# Patient Record
Sex: FEMALE | Race: WHITE | NOT HISPANIC OR LATINO | URBAN - METROPOLITAN AREA
[De-identification: names, ages, dates, MRNs, and addresses within clinical notes are randomized per-mention and may not be internally consistent; named-entity substitution may affect disease eponyms.]

---

## 2021-01-28 ENCOUNTER — EMERGENCY (EMERGENCY)
Facility: HOSPITAL | Age: 51
LOS: 1 days | End: 2021-01-28
Admitting: EMERGENCY MEDICINE
Payer: COMMERCIAL

## 2021-01-28 PROCEDURE — 93010 ELECTROCARDIOGRAM REPORT: CPT

## 2021-01-28 PROCEDURE — 99285 EMERGENCY DEPT VISIT HI MDM: CPT

## 2021-01-29 PROCEDURE — 71045 X-RAY EXAM CHEST 1 VIEW: CPT | Mod: 26

## 2022-01-07 PROBLEM — Z00.00 ENCOUNTER FOR PREVENTIVE HEALTH EXAMINATION: Status: ACTIVE | Noted: 2022-01-07

## 2022-01-12 ENCOUNTER — APPOINTMENT (OUTPATIENT)
Dept: CARDIOLOGY | Facility: CLINIC | Age: 52
End: 2022-01-12
Payer: COMMERCIAL

## 2022-01-12 ENCOUNTER — NON-APPOINTMENT (OUTPATIENT)
Age: 52
End: 2022-01-12

## 2022-01-12 VITALS — SYSTOLIC BLOOD PRESSURE: 144 MMHG | DIASTOLIC BLOOD PRESSURE: 74 MMHG

## 2022-01-12 VITALS
RESPIRATION RATE: 15 BRPM | HEIGHT: 64 IN | SYSTOLIC BLOOD PRESSURE: 150 MMHG | DIASTOLIC BLOOD PRESSURE: 80 MMHG | HEART RATE: 77 BPM | OXYGEN SATURATION: 97 % | BODY MASS INDEX: 34.83 KG/M2 | WEIGHT: 204 LBS | TEMPERATURE: 97.1 F

## 2022-01-12 DIAGNOSIS — Z78.9 OTHER SPECIFIED HEALTH STATUS: ICD-10-CM

## 2022-01-12 DIAGNOSIS — F12.90 CANNABIS USE, UNSPECIFIED, UNCOMPLICATED: ICD-10-CM

## 2022-01-12 DIAGNOSIS — Z87.891 PERSONAL HISTORY OF NICOTINE DEPENDENCE: ICD-10-CM

## 2022-01-12 PROCEDURE — 99205 OFFICE O/P NEW HI 60 MIN: CPT

## 2022-01-12 NOTE — REASON FOR VISIT
[FreeTextEntry1] : Patient is seen because of hyperlipidemia.  The patient has extreme anxiety.  She had anxiety since childhood.  The patient has chest discomfort very frequently.  Is not consistently related to exertion.  She walks around with severe anxiety and a heavy like sensation in her chest.  She describes an evaluation at [facility] although she is uncertain as to exactly what was done.  She has never undergone invasive coronary angiography.  Patient has fairly good exercise capacity without exertional symptoms.\par \par The patient underwent [facility] screening and was found to have a total cholesterol of 314 with an LDL of 205, triglycerides of 300 and HDL of 49.  In addition there was some blockage found in the lower extremities as well as the carotid arteries.  The patient is extremely anxious about these findings and now seeks further advice.  The patient is not on aspirin therapy.  She has been told of high cholesterol in the past but has never embarked on statin therapy.

## 2022-01-12 NOTE — ASSESSMENT
[FreeTextEntry1] : Chest discomfort: ECG reveals anterior ST changes and T wave changes.  [facility] has suspended exercise stress testing because of pandemic spreading concerns.  CT coronary angiography is the patient's best option to rule out ischemic heart disease as an etiology to her chest discomfort and abnormal ECG.  There is no drug allergies.  Baby aspirin daily has been started.  A basic metabolic panel has been arranged 1 week prior to CT coronary angiography.\par \par Hyperlipidemia: Lipitor 40 mg daily has been started.  LFTs and cholesterol profile have been arranged in the fasting state in 6 weeks time.  Prestatin LFTs have also been arranged.\par \par Hypertension: Bystolic has been increased from 5 mg daily to 10 mg daily.

## 2022-01-24 ENCOUNTER — APPOINTMENT (OUTPATIENT)
Dept: CARDIOLOGY | Facility: CLINIC | Age: 52
End: 2022-01-24

## 2023-01-18 ENCOUNTER — NON-APPOINTMENT (OUTPATIENT)
Age: 53
End: 2023-01-18

## 2023-01-30 ENCOUNTER — TRANSCRIPTION ENCOUNTER (OUTPATIENT)
Age: 53
End: 2023-01-30

## 2023-01-30 ENCOUNTER — APPOINTMENT (OUTPATIENT)
Dept: CARDIOLOGY | Facility: CLINIC | Age: 53
End: 2023-01-30
Payer: COMMERCIAL

## 2023-01-30 VITALS
HEART RATE: 84 BPM | TEMPERATURE: 97.4 F | DIASTOLIC BLOOD PRESSURE: 80 MMHG | SYSTOLIC BLOOD PRESSURE: 130 MMHG | OXYGEN SATURATION: 98 % | WEIGHT: 195 LBS | HEIGHT: 63 IN | RESPIRATION RATE: 14 BRPM | BODY MASS INDEX: 34.55 KG/M2

## 2023-01-30 VITALS — SYSTOLIC BLOOD PRESSURE: 134 MMHG | DIASTOLIC BLOOD PRESSURE: 82 MMHG

## 2023-01-30 DIAGNOSIS — F41.9 ANXIETY DISORDER, UNSPECIFIED: ICD-10-CM

## 2023-01-30 DIAGNOSIS — I10 ESSENTIAL (PRIMARY) HYPERTENSION: ICD-10-CM

## 2023-01-30 DIAGNOSIS — Z87.898 PERSONAL HISTORY OF OTHER SPECIFIED CONDITIONS: ICD-10-CM

## 2023-01-30 DIAGNOSIS — R94.31 ABNORMAL ELECTROCARDIOGRAM [ECG] [EKG]: ICD-10-CM

## 2023-01-30 DIAGNOSIS — E78.00 PURE HYPERCHOLESTEROLEMIA, UNSPECIFIED: ICD-10-CM

## 2023-01-30 PROCEDURE — 99214 OFFICE O/P EST MOD 30 MIN: CPT

## 2023-01-30 RX ORDER — NEBIVOLOL 10 MG/1
10 TABLET ORAL DAILY
Qty: 90 | Refills: 1 | Status: DISCONTINUED | COMMUNITY
End: 2023-01-30

## 2023-01-30 RX ORDER — ATORVASTATIN CALCIUM 40 MG/1
40 TABLET, FILM COATED ORAL
Qty: 30 | Refills: 3 | Status: DISCONTINUED | COMMUNITY
Start: 2022-01-12 | End: 2023-01-30

## 2023-01-30 NOTE — REASON FOR VISIT
[FreeTextEntry1] : [place] is a 52-year-old female with severe hyperlipidemia.  \par \par The patient has extreme anxiety.  She had anxiety since childhood.  The patient has chest discomfort very frequently.  Is not consistently related to exertion.  She walks around with severe anxiety and a heavy like sensation in her chest.  She describes an evaluation at [facility] although she is uncertain as to exactly what was done.  She has never undergone invasive coronary angiography.  Patient has fairly good exercise capacity without exertional symptoms.\par \par The patient underwent [facility] screening and was found to have a total cholesterol of 314 with an LDL of 205, triglycerides of 300 and HDL of 49.  In addition there was some blockage found in the lower extremities as well as the carotid arteries?  The patient was extremely anxious about these findings \par \par She had CT of the coronaries which showed 0 calcium score normal coronaries in 2021.  She had echocardiogram in February 2021 which was unremarkable.  Her carotid ultrasound in January 2022 was unremarkable.  There were no significant plaques and there was no obstruction.  She denies claudication.\par \par She was recommended and prescribed statins but she did not take it.  She just started Crestor 10 mg yesterday. \par \par Her last fasting labs on December 27, 2022 showed normal FBS,  (not on statins) and HDL 59.  Normal LFT and creatinine\par \par In the past few weeks, she has started to exercise and lost 10 pounds in weight.  Her chest pains are resolved.  She is feeling much better on buspirone\par \par She used to take Byastolic for hypertension which she has stopped several months ago.  Blood pressure is borderline in the office today.\par \par EKG 1/30/2023: Sinus rhythm.  T inversion in inferior leads.  Also V3 to V6.

## 2023-01-30 NOTE — DISCUSSION/SUMMARY
[FreeTextEntry1] : Hypercholesteremia: Patient had severe hypercholesterolemia with LDL greater than 200 at 1 time.  She probably has familial hypercholesterolemia.  He was recommended statins during OV January 2022 by Dr. Valero which she did not pursue.  She is now on statin which was started yesterday.  Target LDL less than 100.  Check labs in 4 weeks.  The dose of rosuvastatin may have to be increased.  Dietary changes were discussed.  She has started exercise program already.\par \par Hypertension: Borderline.  Currently not on any medications.  Nonpharmacologic ways of reducing blood pressure were discussed.  Blood pressure response to treadmill exercise would be helpful.\par \par Patient does not have any evidence of CAD.  CT calcium score was 0 in 2021 and coronary angiogram was unremarkable on CTA.  She did have uncontrolled hypercholesterolemia since then.  Last echocardiogram will be performed since her baseline EKG is abnormal with  downsloping ST segments and inverted T waves\par \par Anxiety: This has improved significantly with buspirone.  She no longer has chest pain or palpitations.  \par \par No claudication.  Previous carotid ultrasounds have been unremarkable.\par \par Thank you for this referral and allowing me to participate in the care of this patient.  If I can be of any further help or  if you have any questions, please do not hesitate to contact me\par \par \par Sincerely,\par \par Omero Sandoval MD, [facility], [id number]

## 2023-01-30 NOTE — ASSESSMENT
[FreeTextEntry1] : Chest discomfort: ECG reveals anterior ST changes and T wave changes.  [facility] has suspended exercise stress testing because of pandemic spreading concerns.  CT coronary angiography is the patient's best option to rule out ischemic heart disease as an etiology to her chest discomfort and abnormal ECG.  There is no drug allergies.  Baby aspirin daily has been started.  A basic metabolic panel has been arranged 1 week prior to CT coronary angiography.\par \par Hyperlipidemia: Lipitor 40 mg daily has been started.  LFTs and cholesterol profile have been arranged in the fasting state in 6 weeks time.  Prestatin LFTs have also been arranged.\par \par Hypertension: Bystolic has been increased from 5 mg daily to 10 mg daily.

## 2023-02-08 ENCOUNTER — APPOINTMENT (OUTPATIENT)
Dept: OBGYN | Facility: CLINIC | Age: 53
End: 2023-02-08
Payer: COMMERCIAL

## 2023-02-08 VITALS
DIASTOLIC BLOOD PRESSURE: 70 MMHG | HEIGHT: 63 IN | SYSTOLIC BLOOD PRESSURE: 118 MMHG | WEIGHT: 190 LBS | BODY MASS INDEX: 33.66 KG/M2

## 2023-02-08 DIAGNOSIS — Z01.419 ENCOUNTER FOR GYNECOLOGICAL EXAMINATION (GENERAL) (ROUTINE) W/OUT ABNORMAL FINDINGS: ICD-10-CM

## 2023-02-08 DIAGNOSIS — N63.11 UNSPECIFIED LUMP IN THE RIGHT BREAST, UPPER OUTER QUADRANT: ICD-10-CM

## 2023-02-08 PROCEDURE — 99386 PREV VISIT NEW AGE 40-64: CPT

## 2023-02-11 LAB — HPV HIGH+LOW RISK DNA PNL CVX: NOT DETECTED

## 2023-02-12 LAB — CYTOLOGY CVX/VAG DOC THIN PREP: NORMAL

## 2023-02-27 ENCOUNTER — APPOINTMENT (OUTPATIENT)
Dept: ANTEPARTUM | Facility: CLINIC | Age: 53
End: 2023-02-27
Payer: COMMERCIAL

## 2023-02-27 ENCOUNTER — ASOB RESULT (OUTPATIENT)
Age: 53
End: 2023-02-27

## 2023-02-27 PROCEDURE — 76830 TRANSVAGINAL US NON-OB: CPT

## 2023-02-27 PROCEDURE — 76856 US EXAM PELVIC COMPLETE: CPT | Mod: 59

## 2023-02-28 NOTE — HISTORY OF PRESENT ILLNESS
[Patient reported PAP Smear was normal] : Patient reported PAP Smear was normal [TextBox_4] : 51 yo  for annual exam.  Last Pap smear was in .  All paps have been normal. Sexually active with same partner.  No hx abnormal mammograms but persistent cyst right breast. Reports pelvic pain and bleeding  at times with intercourse. [Mammogramdate] : 2012 [PapSmeardate] : 2012

## 2023-02-28 NOTE — PHYSICAL EXAM
[Appropriately responsive] : appropriately responsive [Alert] : alert [No Acute Distress] : no acute distress [No Lymphadenopathy] : no lymphadenopathy [Regular Rate Rhythm] : regular rate rhythm [No Murmurs] : no murmurs [Clear to Auscultation B/L] : clear to auscultation bilaterally [Mass] : mass [Soft] : soft [Non-tender] : non-tender [Non-distended] : non-distended [No HSM] : No HSM [No Lesions] : no lesions [No Mass] : no mass [Oriented x3] : oriented x3 [Examination Of The Breasts] : a normal appearance [Breast Mass Left Breast ___cm] : no mass was palpable [Labia Majora] : normal [Labia Minora] : normal [Normal] : normal [Uterine Adnexae] : absent [External Hemorrhoid] : external hemorrhoid [___cm] : a ~M [unfilled] ~Ucm superior lateral quadrant mass was palpated [FreeTextEntry6] : 11:00 right breast, soft, non-mobile, ovoid [FreeTextEntry9] : hemorrhoid not bothersome to patient

## 2023-02-28 NOTE — PLAN
[FreeTextEntry1] : Unremarkable  pelvic exam\par Pap and HPV collected\par CBE significant for upper outer right breast mass\par Dg Mammo and US ordered\par Healthy diet, exercise, sleep hygiene discussed\par  I reviewed dietary, vitamin and exercise measures for maintaining optimal bone density and patient verbalizes understanding of these recommendations.\par TVUS ordered for pelvic pain\par RTO x 1 year or prn\par Importance of screening colonoscopy stressed

## 2023-03-01 ENCOUNTER — APPOINTMENT (OUTPATIENT)
Dept: OBGYN | Facility: CLINIC | Age: 53
End: 2023-03-01
Payer: COMMERCIAL

## 2023-03-01 DIAGNOSIS — R10.2 PELVIC AND PERINEAL PAIN: ICD-10-CM

## 2023-03-01 DIAGNOSIS — N63.10 UNSPECIFIED LUMP IN THE RIGHT BREAST, UNSPECIFIED QUADRANT: ICD-10-CM

## 2023-03-01 PROCEDURE — 98967 PH1 ASSMT&MGMT NQHP 11-20: CPT

## 2023-03-01 NOTE — HISTORY OF PRESENT ILLNESS
[FreeTextEntry1] : 1 yo  for No hx abnormal mammograms but persistent cyst right breast. Reports pelvic pain and bleeding at times with intercourse. \par TVUS WNL but ovaries not visualized due to bowel.  Has not yet had breast diagnostic mammogram and US\par Agrees to telephonic

## 2023-03-01 NOTE — PLAN
[FreeTextEntry1] : We discussed findings of TVUS.  Results showed EML of 4mm, no adnexal masses, heterogenous uterus, unable to visualize left ovary due to bowel.  Patient advised to repeat and prepare for several days by  non-gaseous foods, may use Metamucil to move bowels before exam.  \par \par She is to schedule Dx mammo and US as ordered at previous visit\par  Patient verbalizes understanding of and agreement with this plan.  All questions answered to patient's satisfaction.\par \par 11 minutes spent in conversation and review

## 2023-03-02 ENCOUNTER — APPOINTMENT (OUTPATIENT)
Dept: CARDIOLOGY | Facility: CLINIC | Age: 53
End: 2023-03-02
Payer: COMMERCIAL

## 2023-03-02 PROCEDURE — 93306 TTE W/DOPPLER COMPLETE: CPT

## 2023-03-08 ENCOUNTER — APPOINTMENT (OUTPATIENT)
Dept: CARDIOLOGY | Facility: CLINIC | Age: 53
End: 2023-03-08

## 2023-03-10 ENCOUNTER — APPOINTMENT (OUTPATIENT)
Dept: CARDIOLOGY | Facility: CLINIC | Age: 53
End: 2023-03-10

## 2023-03-14 ENCOUNTER — APPOINTMENT (OUTPATIENT)
Dept: ANTEPARTUM | Facility: CLINIC | Age: 53
End: 2023-03-14

## 2023-03-14 DIAGNOSIS — Z90.721 ACQUIRED ABSENCE OF OVARIES, UNILATERAL: ICD-10-CM

## 2023-03-14 DIAGNOSIS — Z87.42 PERSONAL HISTORY OF OTHER DISEASES OF THE FEMALE GENITAL TRACT: ICD-10-CM

## 2023-03-17 ENCOUNTER — RESULT REVIEW (OUTPATIENT)
Age: 53
End: 2023-03-17

## 2023-03-17 ENCOUNTER — APPOINTMENT (OUTPATIENT)
Dept: ULTRASOUND IMAGING | Facility: CLINIC | Age: 53
End: 2023-03-17

## 2023-03-17 ENCOUNTER — APPOINTMENT (OUTPATIENT)
Dept: MAMMOGRAPHY | Facility: CLINIC | Age: 53
End: 2023-03-17
Payer: COMMERCIAL

## 2023-03-17 PROCEDURE — 77066 DX MAMMO INCL CAD BI: CPT

## 2023-03-17 PROCEDURE — G0279: CPT

## 2023-03-17 PROCEDURE — 76641 ULTRASOUND BREAST COMPLETE: CPT | Mod: 50

## 2023-03-23 ENCOUNTER — RESULT REVIEW (OUTPATIENT)
Age: 53
End: 2023-03-23

## 2023-03-23 ENCOUNTER — NON-APPOINTMENT (OUTPATIENT)
Age: 53
End: 2023-03-23

## 2023-03-23 ENCOUNTER — APPOINTMENT (OUTPATIENT)
Dept: CT IMAGING | Facility: CLINIC | Age: 53
End: 2023-03-23
Payer: COMMERCIAL

## 2023-03-23 PROCEDURE — 72193 CT PELVIS W/DYE: CPT

## 2023-04-28 ENCOUNTER — APPOINTMENT (OUTPATIENT)
Dept: CARDIOLOGY | Facility: CLINIC | Age: 53
End: 2023-04-28

## 2023-04-28 RX ORDER — BUSPIRONE HYDROCHLORIDE 15 MG/1
15 TABLET ORAL
Qty: 90 | Refills: 0 | Status: DISCONTINUED | COMMUNITY
Start: 2023-01-30 | End: 2023-04-28

## 2025-06-18 ENCOUNTER — APPOINTMENT (OUTPATIENT)
Dept: OPHTHALMOLOGY | Facility: CLINIC | Age: 55
End: 2025-06-18

## 2025-06-20 ENCOUNTER — NON-APPOINTMENT (OUTPATIENT)
Age: 55
End: 2025-06-20

## 2025-09-05 ENCOUNTER — APPOINTMENT (OUTPATIENT)
Dept: ORTHOPEDIC SURGERY | Facility: CLINIC | Age: 55
End: 2025-09-05
Payer: COMMERCIAL

## 2025-09-05 VITALS — BODY MASS INDEX: 33.12 KG/M2 | HEIGHT: 64 IN | WEIGHT: 194 LBS

## 2025-09-05 DIAGNOSIS — M25.562 PAIN IN LEFT KNEE: ICD-10-CM

## 2025-09-05 DIAGNOSIS — M17.12 UNILATERAL PRIMARY OSTEOARTHRITIS, LEFT KNEE: ICD-10-CM

## 2025-09-05 DIAGNOSIS — Z86.39 PERSONAL HISTORY OF OTHER ENDOCRINE, NUTRITIONAL AND METABOLIC DISEASE: ICD-10-CM

## 2025-09-05 DIAGNOSIS — E78.00 PURE HYPERCHOLESTEROLEMIA, UNSPECIFIED: ICD-10-CM

## 2025-09-05 PROCEDURE — 73564 X-RAY EXAM KNEE 4 OR MORE: CPT | Mod: LT

## 2025-09-05 PROCEDURE — 99204 OFFICE O/P NEW MOD 45 MIN: CPT | Mod: 25

## 2025-09-05 RX ORDER — TRAZODONE HYDROCHLORIDE 300 MG/1
TABLET ORAL
Refills: 0 | Status: ACTIVE | COMMUNITY

## 2025-09-05 RX ORDER — MELOXICAM 15 MG/1
15 TABLET ORAL
Qty: 30 | Refills: 1 | Status: ACTIVE | COMMUNITY
Start: 2025-09-05 | End: 1900-01-01

## 2025-09-05 RX ORDER — ROSUVASTATIN CALCIUM 5 MG/1
TABLET, FILM COATED ORAL
Refills: 0 | Status: ACTIVE | COMMUNITY

## 2025-09-05 RX ORDER — OLMESARTAN MEDOXOMIL 40 MG/1
TABLET, FILM COATED ORAL
Refills: 0 | Status: ACTIVE | COMMUNITY